# Patient Record
Sex: FEMALE | Race: OTHER | Employment: UNEMPLOYED | URBAN - METROPOLITAN AREA
[De-identification: names, ages, dates, MRNs, and addresses within clinical notes are randomized per-mention and may not be internally consistent; named-entity substitution may affect disease eponyms.]

---

## 2018-04-02 ENCOUNTER — OFFICE VISIT (OUTPATIENT)
Dept: FAMILY MEDICINE CLINIC | Age: 23
End: 2018-04-02

## 2018-04-02 VITALS
TEMPERATURE: 98.7 F | DIASTOLIC BLOOD PRESSURE: 80 MMHG | SYSTOLIC BLOOD PRESSURE: 122 MMHG | HEART RATE: 65 BPM | OXYGEN SATURATION: 100 % | BODY MASS INDEX: 38.25 KG/M2 | WEIGHT: 238 LBS | RESPIRATION RATE: 16 BRPM | HEIGHT: 66 IN

## 2018-04-02 DIAGNOSIS — R10.9 ABDOMINAL CRAMPING: Primary | ICD-10-CM

## 2018-04-02 DIAGNOSIS — N92.6 IRREGULAR MENSES: ICD-10-CM

## 2018-04-02 LAB
BILIRUB UR QL STRIP: NEGATIVE
GLUCOSE UR-MCNC: NEGATIVE MG/DL
HCG URINE, QL. (POC): NEGATIVE
KETONES P FAST UR STRIP-MCNC: NEGATIVE MG/DL
PH UR STRIP: 7 [PH] (ref 4.6–8)
PROT UR QL STRIP: NEGATIVE
SP GR UR STRIP: 1.01 (ref 1–1.03)
UA UROBILINOGEN AMB POC: NORMAL (ref 0.2–1)
URINALYSIS CLARITY POC: CLEAR
URINALYSIS COLOR POC: YELLOW
URINE BLOOD POC: NORMAL
URINE LEUKOCYTES POC: NEGATIVE
URINE NITRITES POC: NEGATIVE
VALID INTERNAL CONTROL?: YES

## 2018-04-02 RX ORDER — NORGESTIMATE AND ETHINYL ESTRADIOL 7DAYSX3 28
1 KIT ORAL DAILY
Qty: 1 PACKAGE | Refills: 11 | Status: SHIPPED | OUTPATIENT
Start: 2018-04-02

## 2018-04-02 RX ORDER — NORGESTIMATE AND ETHINYL ESTRADIOL 7DAYSX3 28
1 KIT ORAL DAILY
Qty: 1 PACKAGE | Refills: 11 | Status: SHIPPED | OUTPATIENT
Start: 2018-04-02 | End: 2018-04-02 | Stop reason: SDUPTHER

## 2018-04-02 NOTE — MR AVS SNAPSHOT
[address] 
[phone] Patient: Dalton Aceves MRN: [id number] FUM:[id number] Visit Information Date & Time Provider Department Dept. Phone Encounter #  
 4/2/2018  4:05 PM Susan Shrestha MD [address] [facility] [phone] [id number] Upcoming Health Maintenance Date Due  
 HPV AGE 9Y-34Y (1 of 3 - Female 3 Dose Series) 5/23/2006 DTaP/Tdap/Td series (1 - Tdap) 5/23/2016 PAP AKA CERVICAL CYTOLOGY 5/23/2016 Influenza Age 5 to Adult 8/1/2017 Allergies as of 4/2/2018  Review Complete On: 4/2/2018 By: Betsy Patel LPN No Known Allergies Current Immunizations  Never Reviewed No immunizations on file. Not reviewed this visit You Were Diagnosed With   
  
 Codes Comments Abdominal cramping    -  Primary ICD-10-CM: R10.9 ICD-9-CM: 789.00 Irregular menses     ICD-10-CM: N92.6 ICD-9-CM: 626.4 Abnormal uterine bleeding     ICD-10-CM: N93.9 ICD-9-CM: 626.9 Vitals BP Pulse Temp Resp Height(growth percentile) Weight(growth percentile) 122/80 (BP 1 Location: Right arm, BP Patient Position: Sitting) 65 98.7 °F (37.1 °C) (Oral) 16 5' 6\" (1.676 m) 238 lb (108 kg) LMP SpO2 BMI OB Status Smoking Status 04/02/2018 100% 38.41 kg/m2 Having regular periods Former Smoker Vitals History BMI and BSA Data Body Mass Index Body Surface Area  
 38.41 kg/m 2 2.24 m 2 Preferred Pharmacy Pharmacy Name Phone [id number], [address] [facility] [phone] Your Updated Medication List  
  
   
This list is accurate as of 4/2/18  4:41 PM.  Always use your most recent med list.  
  
  
  
  
 aspirin-acetaminophen-caffeine 250-250-65 mg per tablet Commonly known as:  EXCEDRIN ES Take 1 Tab by mouth. MIDOL MAX ST MENSTRUAL 500-60-15 mg Tab Generic drug:  acetaminophen-caff-pyrilamine Take  by mouth. MOTRIN PO Take  by mouth. norgestimate-ethinyl estradiol 0.18/0.215/0.25 mg-35 mcg (28) Tab Commonly known as:  TRI-SPRINTEC (28) Take 1 Tab by mouth daily. We Performed the Following AMB POC URINALYSIS DIP STICK AUTO W/O MICRO [96583 CPT(R)] AMB POC URINE PREGNANCY TEST, VISUAL COLOR COMPARISON [63282 CPT(R)] TESTOSTERONE, FREE [id number] CPT(R)] TESTOSTERONE, TOTAL, FEMALE/CHILD [id number] CPT(R)] Patient Instructions Polycystic Ovary Syndrome: Care Instructions Your Care Instructions Polycystic ovary syndrome, or PCOS, means a woman's hormones are out of balance. It can cause problems with your periods and make it hard to get pregnant. Doctors don't know for sure what causes PCOS, but it seems to run in families. It also seems to be linked to obesity and a risk for diabetes. If you have PCOS, your sisters and daughters have a higher chance of getting it too. You may have other symptoms. These include weight gain, acne, too much hair growth on the face or body, high blood pressure, and high blood sugar. Your ovaries may have cysts on them. These cysts are growths filled with fluid. Keep in mind that although you may not have regular periods, you can still get pregnant. Talk to your doctor about birth control if you do not want to get pregnant. Sometimes the hormone changes with PCOS can also make it hard for some women to get pregnant. If this is a concern, talk to your doctor about treatment for this problem. Women who have PCOS can go for months or longer with no period. Your doctor may recommend medicines that can help get your cycles back to normal. 
Follow-up care is a key part of your treatment and safety. Be sure to make and go to all appointments, and call your doctor if you are having problems. It's also a good idea to know your test results and keep a list of the medicines you take. How can you care for yourself at home? · Take your medicines exactly as prescribed. Call your doctor if you think you are having a problem with your medicine. · Eat a healthy diet. Include fruits, vegetables, beans, and whole grains in your diet each day. · If you are overweight, losing weight can help with many of the symptoms of PCOS. Talk to your doctor about safe ways to lose weight. · Get at least 30 minutes of exercise on most days of the week. Walking is a good choice. Or you can run, swim, cycle, or play tennis or team sports. · For hair growth you don't want, try bleaching, plucking, electrolysis, or laser therapy. · Acne can be treated with over-the-counter medicines. Look for ones that have benzoyl peroxide or salicylic acid in them. When should you call for help? Call your doctor now or seek immediate medical care if: 
? · You have severe vaginal bleeding. ? · You have new or worse belly or pelvic pain. ? Watch closely for changes in your health, and be sure to contact your doctor if: 
? · You do not get better as expected. ? · You have unusual vaginal bleeding. Where can you learn more? Go to http://della-catia.info/. Enter M112 in the search box to learn more about \"Polycystic Ovary Syndrome: Care Instructions. \" Current as of: October 13, 2016 Content Version: 11.4 © 2179-0248 [employer]. Care instructions adapted under license by [employer] (which disclaims liability or warranty for this information). If you have questions about a medical condition or this instruction, always ask your healthcare professional. [address] any warranty or liability for your use of this information. Introducing [facility]! Tiff Puckett introduces [employer] patient portal. Now you can access parts of your medical record, email your doctor's office, and request medication refills online.    
 
1. In your internet browser, go to [email or web address] 2. Click on the First Time User? Click Here link in the Sign In box. You will see the New Member Sign Up page. 3. Enter your [employer] Access Code exactly as it appears below. You will not need to use this code after youve completed the sign-up process. If you do not sign up before the expiration date, you must request a new code. · [employer] Access Code: [email or web address] Expires: 7/1/2018  4:41 PM 
 
4. Enter the last four digits of your Social Security Number (xxxx) and Date of Birth (mm/dd/yyyy) as indicated and click Submit. You will be taken to the next sign-up page. 5. Create a HotLinkt ID. This will be your [employer] login ID and cannot be changed, so think of one that is secure and easy to remember. 6. Create a [employer] password. You can change your password at any time. 7. Enter your Password Reset Question and Answer. This can be used at a later time if you forget your password. 8. Enter your e-mail address. You will receive e-mail notification when new information is available in [address] 9. Click Sign Up. You can now view and download portions of your medical record. 10. Click the Download Summary menu link to download a portable copy of your medical information. If you have questions, please visit the Frequently Asked Questions section of the [employer] website. Remember, [employer] is NOT to be used for urgent needs. For medical emergencies, dial 911. Now available from your iPhone and Android! Please provide this summary of care documentation to your next provider. Your primary care clinician is listed as Betty Hendrickson. If you have any questions after today's visit, please call [phone].

## 2018-04-02 NOTE — PATIENT INSTRUCTIONS
Polycystic Ovary Syndrome: Care Instructions  Your Care Instructions  Polycystic ovary syndrome, or PCOS, means a woman's hormones are out of balance. It can cause problems with your periods and make it hard to get pregnant. Doctors don't know for sure what causes PCOS, but it seems to run in families. It also seems to be linked to obesity and a risk for diabetes. If you have PCOS, your sisters and daughters have a higher chance of getting it too. You may have other symptoms. These include weight gain, acne, too much hair growth on the face or body, high blood pressure, and high blood sugar. Your ovaries may have cysts on them. These cysts are growths filled with fluid. Keep in mind that although you may not have regular periods, you can still get pregnant. Talk to your doctor about birth control if you do not want to get pregnant. Sometimes the hormone changes with PCOS can also make it hard for some women to get pregnant. If this is a concern, talk to your doctor about treatment for this problem. Women who have PCOS can go for months or longer with no period. Your doctor may recommend medicines that can help get your cycles back to normal.  Follow-up care is a key part of your treatment and safety. Be sure to make and go to all appointments, and call your doctor if you are having problems. It's also a good idea to know your test results and keep a list of the medicines you take. How can you care for yourself at home? · Take your medicines exactly as prescribed. Call your doctor if you think you are having a problem with your medicine. · Eat a healthy diet. Include fruits, vegetables, beans, and whole grains in your diet each day. · If you are overweight, losing weight can help with many of the symptoms of PCOS. Talk to your doctor about safe ways to lose weight. · Get at least 30 minutes of exercise on most days of the week. Walking is a good choice.  Or you can run, swim, cycle, or play tennis or team sports. · For hair growth you don't want, try bleaching, plucking, electrolysis, or laser therapy. · Acne can be treated with over-the-counter medicines. Look for ones that have benzoyl peroxide or salicylic acid in them. When should you call for help? Call your doctor now or seek immediate medical care if:  ? · You have severe vaginal bleeding. ? · You have new or worse belly or pelvic pain. ? Watch closely for changes in your health, and be sure to contact your doctor if:  ? · You do not get better as expected. ? · You have unusual vaginal bleeding. Where can you learn more? Go to http://della-catia.info/. Enter N484 in the search box to learn more about \"Polycystic Ovary Syndrome: Care Instructions. \"  Current as of: October 13, 2016  Content Version: 11.4  © 0617-7884 [employer]. Care instructions adapted under license by [employer] (which disclaims liability or warranty for this information). If you have questions about a medical condition or this instruction, always ask your healthcare professional. [address] any warranty or liability for your use of this information.

## 2018-04-02 NOTE — PROGRESS NOTES
Subjective  CC: Nirmal Dickson is an 25 y.o. female presents for evaluation of pelvic pain. Patient states she's had this pain for years. She describes the pain as in her pelvic area, sharp and crampy. It typically is about a week before and a week after her period. It usually lateralizes to one side. No bleeding with these episodes. OTC medications help dull the pain. No nausea or vomiting. Unfortunately, patient started her menstrual cycle today. Her menses usually last 7 days, heavy for the first 3 days. Upon further history, patient started menses at 15. Have always been irregular other than when she was on birth control. Associated with heavy bleeding and cramping. She also has facial hair (chin and lip). Never heard the term PCOS before, does not think she's been evaluated for it. She states that when she was on birth control the pain did go away. She has not ever had a pap smear that she knows of. No fevers, chills, nausea, vomiting. No hx of migraine. No history of VTE. I have reviewed previous encounters, notable for patient with dysmenorrhea and being on OCPs with good control of the dysmenorrhea and abnormal uterine bleeding per chart review. Allergies - reviewed:   No Known Allergies      Medications - reviewed:   Current Outpatient Prescriptions   Medication Sig    aspirin-acetaminophen-caffeine (EXCEDRIN ES) 250-250-65 mg per tablet Take 1 Tab by mouth.  acetaminophen-caff-pyrilamine (MIDOL MAX ST MENSTRUAL) 500-60-15 mg tab Take  by mouth.  norgestimate-ethinyl estradiol (TRI-SPRINTEC, 28,) 0.18/0.215/0.25 mg-35 mcg (28) tab Take 1 Tab by mouth daily.  IBUPROFEN (MOTRIN PO) Take  by mouth. No current facility-administered medications for this visit. Past Medical History - reviewed:  History reviewed. No pertinent past medical history. Immunizations - reviewed:      There is no immunization history on file for this patient. ROS  Review of Systems : A complete review of systems was performed and is negative except for those mentioned in the HPI. Physical Exam  Visit Vitals    /80 (BP 1 Location: Right arm, BP Patient Position: Sitting)    Pulse 65    Temp 98.7 °F (37.1 °C) (Oral)    Resp 16    Ht 5' 6\" (1.676 m)    Wt 238 lb (108 kg)    LMP 04/02/2018    SpO2 100%    BMI 38.41 kg/m2       General appearance - Alert, NAD. Head: Atraumatic. Normocephalic. Eyes: EOMI. Sclera white. Respiratory - LCTAB. No wheeze/rale/rhonchi  Heart - Normal rate, regular rhythm. No m/r/r  Abdomen - Soft, non tender. Non distended. No CVAT. Extremities - No LE edema. Distal pulses intact  Skin - normal coloration and normal turgor. No cyanosis, no rash. Coarse hair noted on chin and upper lip  Pelvic - unable to complete as on her first day of her menses    Assessment/Plan    1. Abdominal cramping: worsening, with additional workup. UA shows 2+ blood but no LE no nitrates, UPT shows negative. No history of pap smear. Unfortunately, patient started menses today and it is heavy. Needs to return for pap smear. Given reported history will check labs for PCOS, most high on the differential at this time. Patient has, at best, 2/3 criteria with history of irregular menses and hirsutism. Will Start back on OCPs as they helped in the past. Will augment plan based on results  - TSH, Prolactin, HbA1c (time constraints wont permit 2H GTT), Lipid panel, Total testosterone--all to be drawn tomorrow AM  - TRI SPRINTEC  - AMB POC URINALYSIS DIP STICK AUTO W/O MICRO  - AMB POC URINE PREGNANCY TEST, VISUAL COLOR COMPARISON      2. Irregular menses - see above. Concern for PCOS  - TESTOSTERONE, TOTAL, FEMALE/CHILD  - TESTOSTERONE, FREE        I have discussed the aforementioned diagnoses and plan with the patient in detail. I have provided information in person and/or in AVS. All questions answered prior to discharge.     Kunal Landry, MD  Family Medicine Resident  PGY 3

## 2018-04-08 LAB
CHOLEST SERPL-MCNC: 157 MG/DL (ref 100–199)
EST. AVERAGE GLUCOSE BLD GHB EST-MCNC: 105 MG/DL
HBA1C MFR BLD: 5.3 % (ref 4.8–5.6)
HDLC SERPL-MCNC: 31 MG/DL
INTERPRETATION, 910389: NORMAL
LDLC SERPL CALC-MCNC: 83 MG/DL (ref 0–99)
PROLACTIN SERPL-MCNC: 36.5 NG/ML (ref 4.8–23.3)
TESTOST FREE SERPL-MCNC: 1.9 PG/ML (ref 0–4.2)
TESTOST SERPL-MCNC: 19.2 NG/DL (ref 10–55)
TRIGL SERPL-MCNC: 213 MG/DL (ref 0–149)
TSH SERPL DL<=0.005 MIU/L-ACNC: 1.27 UIU/ML (ref 0.45–4.5)
VLDLC SERPL CALC-MCNC: 43 MG/DL (ref 5–40)

## 2018-04-09 ENCOUNTER — TELEPHONE (OUTPATIENT)
Dept: FAMILY MEDICINE CLINIC | Age: 23
End: 2018-04-09

## 2018-04-09 DIAGNOSIS — E22.1 HYPERPROLACTINEMIA (HCC): Primary | ICD-10-CM

## 2018-04-09 NOTE — TELEPHONE ENCOUNTER
04/09/18  8:56 AM    Attempted to call patient, no answer. Lab notable for elevated prolactin. Need to order MRI to rule out mass lesion.     Rachel Teresa MD  04/09/18

## 2018-04-09 NOTE — TELEPHONE ENCOUNTER
3:33 PM  04/09/18    Patient called earlier x3 for results. Once she was unable to talk. Twice, went to voicemail. Advised of her lab results. Work on diet and exercise for cholesterol. For elevated prolactin needs MRI. Discussed risks and benefits as well as possible differential.    She will do MRI ASAP. She denies visual disturbances. Reports one headache last week, but was on menses and gets headaches on menstrual cycle.     Caridad Prasad MD  04/09/18

## 2018-04-26 ENCOUNTER — TELEPHONE (OUTPATIENT)
Dept: FAMILY MEDICINE CLINIC | Age: 23
End: 2018-04-26

## 2018-04-26 DIAGNOSIS — R79.89 ELEVATED PROLACTIN LEVEL: ICD-10-CM

## 2018-04-26 NOTE — TELEPHONE ENCOUNTER
04/26/18    Called patient no answer. Boyfriend answered. Told to return call here. I reviewed MRI from [facility]. Read as normal  Referral for Endocrinology.     Dev Olivarez MD

## 2018-05-11 ENCOUNTER — TELEPHONE (OUTPATIENT)
Dept: FAMILY MEDICINE CLINIC | Age: 23
End: 2018-05-11

## 2018-05-11 NOTE — TELEPHONE ENCOUNTER
Attempted to call patient regarding referral placed for endocrinologist. Left voicemail for patient to return call.

## 2018-05-11 NOTE — TELEPHONE ENCOUNTER
----- Message from Jazmyne Garibay sent at 5/11/2018  3:24 PM EDT -----  Regarding: Dr. Nathaniel Villegas  The patient missed a call from the office and is requesting a call back.  (c)[phone]

## 2018-05-11 NOTE — TELEPHONE ENCOUNTER
Patient would like to know why she's being referred to Endocrinology.  Patient states she's unaware of this.    thanks

## 2018-05-14 NOTE — TELEPHONE ENCOUNTER
Second attempt at calling patient regarding referral placed for Endocrinologist.Left voicemail for patient to return call.

## 2018-05-16 NOTE — TELEPHONE ENCOUNTER
Third attempt at calling patient regarding wanting to know why Endocrinologist referral was placed. Left voicemail for patient to return call.

## 2018-05-31 ENCOUNTER — TELEPHONE (OUTPATIENT)
Dept: FAMILY MEDICINE CLINIC | Age: 23
End: 2018-05-31

## 2018-06-11 DIAGNOSIS — E22.1 HYPERPROLACTINEMIA (HCC): ICD-10-CM
